# Patient Record
Sex: MALE | Race: WHITE | NOT HISPANIC OR LATINO | Employment: FULL TIME | URBAN - METROPOLITAN AREA
[De-identification: names, ages, dates, MRNs, and addresses within clinical notes are randomized per-mention and may not be internally consistent; named-entity substitution may affect disease eponyms.]

---

## 2023-09-01 ENCOUNTER — TELEPHONE (OUTPATIENT)
Dept: GASTROENTEROLOGY | Facility: CLINIC | Age: 48
End: 2023-09-01
Payer: COMMERCIAL

## 2023-09-01 NOTE — TELEPHONE ENCOUNTER
Pt. Has never been seen here before. He may have an OV with MD or NP. Agree if he's having severe pain and unable to have BM needs to go to ER.   KN

## 2023-09-01 NOTE — TELEPHONE ENCOUNTER
Returned callers call and states he been having stomach pain since last Thursday, no change in food or anuthing, has lost 10 lb from all this and he states its liquid but little stool when coming out and also a little blood. Did go to urgent care on [address] and they did a Xray ans states they seen he was backed up and he also states he will get the records and he also is going to [facility] to be seen.

## 2023-09-01 NOTE — TELEPHONE ENCOUNTER
----- Message from Shoshana Boo sent at 9/1/2023 11:33 AM CDT -----  Contact: Patient  Type:  Same Day Appointment Request    Caller is requesting a same day appointment.  Caller declined first available appointment listed below.    Name of Caller:Freya Villalpando   When is the first available appointment?11/22  Symptoms:unable to produce bowel movement   Best Call Back Number:[phone]  Additional Information:

## 2023-09-06 ENCOUNTER — TELEPHONE (OUTPATIENT)
Dept: GASTROENTEROLOGY | Facility: CLINIC | Age: 48
End: 2023-09-06
Payer: COMMERCIAL

## 2023-09-06 NOTE — TELEPHONE ENCOUNTER
----- Message from Dolores Cerrato sent at 9/6/2023  2:53 PM CDT -----  Contact: pt  Pt returning a call about np appt and can be reached at [phone]     Thanks,

## 2023-09-06 NOTE — TELEPHONE ENCOUNTER
Reached out to pt and l/m for him if he wanted to make a OV w/ MD or NP since he has not been since before by the office.

## 2023-09-07 ENCOUNTER — TELEPHONE (OUTPATIENT)
Dept: GASTROENTEROLOGY | Facility: CLINIC | Age: 48
End: 2023-09-07
Payer: COMMERCIAL

## 2023-09-07 NOTE — TELEPHONE ENCOUNTER
Per Sergeyi: attempted to get pt scheduled. He declined appt at this time. States he is feeling better. -KG

## 2023-09-19 ENCOUNTER — OFFICE VISIT (OUTPATIENT)
Dept: FAMILY MEDICINE | Facility: CLINIC | Age: 48
End: 2023-09-19
Payer: COMMERCIAL

## 2023-09-19 VITALS
WEIGHT: 173.81 LBS | TEMPERATURE: 97 F | OXYGEN SATURATION: 97 % | HEART RATE: 62 BPM | RESPIRATION RATE: 15 BRPM | BODY MASS INDEX: 25.74 KG/M2 | HEIGHT: 69 IN | SYSTOLIC BLOOD PRESSURE: 118 MMHG | DIASTOLIC BLOOD PRESSURE: 80 MMHG

## 2023-09-19 DIAGNOSIS — E78.5 HYPERLIPIDEMIA, UNSPECIFIED HYPERLIPIDEMIA TYPE: ICD-10-CM

## 2023-09-19 DIAGNOSIS — Z13.31 POSITIVE DEPRESSION SCREENING: ICD-10-CM

## 2023-09-19 DIAGNOSIS — Z76.89 ENCOUNTER TO ESTABLISH CARE: Primary | ICD-10-CM

## 2023-09-19 DIAGNOSIS — F17.210 CIGARETTE NICOTINE DEPENDENCE WITHOUT COMPLICATION: ICD-10-CM

## 2023-09-19 PROCEDURE — 99386 PR PREVENTIVE VISIT,NEW,40-64: ICD-10-PCS | Mod: S$GLB,,, | Performed by: INTERNAL MEDICINE

## 2023-09-19 PROCEDURE — 1159F MED LIST DOCD IN RCRD: CPT | Mod: CPTII,S$GLB,, | Performed by: INTERNAL MEDICINE

## 2023-09-19 PROCEDURE — 3008F BODY MASS INDEX DOCD: CPT | Mod: CPTII,S$GLB,, | Performed by: INTERNAL MEDICINE

## 2023-09-19 PROCEDURE — 99386 PREV VISIT NEW AGE 40-64: CPT | Mod: S$GLB,,, | Performed by: INTERNAL MEDICINE

## 2023-09-19 PROCEDURE — 3008F PR BODY MASS INDEX (BMI) DOCUMENTED: ICD-10-PCS | Mod: CPTII,S$GLB,, | Performed by: INTERNAL MEDICINE

## 2023-09-19 PROCEDURE — 3079F PR MOST RECENT DIASTOLIC BLOOD PRESSURE 80-89 MM HG: ICD-10-PCS | Mod: CPTII,S$GLB,, | Performed by: INTERNAL MEDICINE

## 2023-09-19 PROCEDURE — 3074F PR MOST RECENT SYSTOLIC BLOOD PRESSURE < 130 MM HG: ICD-10-PCS | Mod: CPTII,S$GLB,, | Performed by: INTERNAL MEDICINE

## 2023-09-19 PROCEDURE — 1160F PR REVIEW ALL MEDS BY PRESCRIBER/CLIN PHARMACIST DOCUMENTED: ICD-10-PCS | Mod: CPTII,S$GLB,, | Performed by: INTERNAL MEDICINE

## 2023-09-19 PROCEDURE — 1160F RVW MEDS BY RX/DR IN RCRD: CPT | Mod: CPTII,S$GLB,, | Performed by: INTERNAL MEDICINE

## 2023-09-19 PROCEDURE — 1159F PR MEDICATION LIST DOCUMENTED IN MEDICAL RECORD: ICD-10-PCS | Mod: CPTII,S$GLB,, | Performed by: INTERNAL MEDICINE

## 2023-09-19 PROCEDURE — 3074F SYST BP LT 130 MM HG: CPT | Mod: CPTII,S$GLB,, | Performed by: INTERNAL MEDICINE

## 2023-09-19 PROCEDURE — 3079F DIAST BP 80-89 MM HG: CPT | Mod: CPTII,S$GLB,, | Performed by: INTERNAL MEDICINE

## 2023-09-19 RX ORDER — IBUPROFEN 800 MG/1
800 TABLET ORAL DAILY PRN
COMMUNITY

## 2023-09-19 RX ORDER — PRAVASTATIN SODIUM 40 MG/1
TABLET ORAL
COMMUNITY
Start: 2023-05-27

## 2023-09-19 NOTE — PROGRESS NOTES
Subjective:       Patient ID: Autumn Villalpando is a 48 y.o. male.    Chief Complaint: Establish Care (New pt. To est care.  Pt. Has been having GI issues/colitis, but had appt with Dr. Coughlin for this.  No other c/o today.)    HPI    48 y.o. male here to establish care.     Has apt w/ Dr. Coughlin on 9/26.  [facility] and [facility] ca screen CT.     Was on wellbutrin for depression and smoking cessation but felt like no benefit so it stopped.   [facility].     The patient has no Health Maintenance topics of status Not Due    Health Maintenance Due   Topic Date Due    Hepatitis C Screening  Never done    Lipid Panel  Never done    Pneumococcal Vaccines (Age 0-64) (1 - PCV) Never done    HIV Screening  Never done    TETANUS VACCINE  Never done    Hemoglobin A1c (Diabetic Prevention Screening)  Never done    COVID-19 Vaccine (2 - Moderna series) 10/22/2021    Colorectal Cancer Screening  Never done    Influenza Vaccine (1) Never done         Medical Problems:      Patient Active Problem List   Diagnosis    Hyperlipidemia    Cigarette nicotine dependence without complication    Positive depression screening       Current Outpatient Medications on File Prior to Visit   Medication Sig Dispense Refill    ibuprofen (ADVIL,MOTRIN) 800 MG tablet Take 800 mg by mouth daily as needed for Pain.      pravastatin (PRAVACHOL) 40 MG tablet        No current facility-administered medications on file prior to visit.           Past Medical History:   Diagnosis Date    Chronic headaches     Depression     Mixed hyperlipidemia     PTSD (post-traumatic stress disorder)     TBI (traumatic brain injury)      Past Surgical History:   Procedure Laterality Date    WISDOM TOOTH EXTRACTION       History reviewed. No pertinent family history.  Social History     Socioeconomic History    Marital status:    Tobacco Use    Smoking status: Every Day     Current packs/day: 1.00     Types: Cigarettes     " Passive exposure: Past    Smokeless tobacco: Never   Substance and Sexual Activity    Alcohol use: Yes    Drug use: Never     Review of patient's allergies indicates:  No Known Allergies    Current Outpatient Medications:     ibuprofen (ADVIL,MOTRIN) 800 MG tablet, Take 800 mg by mouth daily as needed for Pain., Disp: , Rfl:     pravastatin (PRAVACHOL) 40 MG tablet, , Disp: , Rfl:         Review of Systems   Constitutional:  Negative for diaphoresis and fever.   HENT:  Negative for trouble swallowing.    Respiratory:  Negative for cough and shortness of breath.    Cardiovascular:  Negative for chest pain and leg swelling.   Gastrointestinal:  Positive for abdominal pain and diarrhea.   Genitourinary:  Negative for difficulty urinating.   Musculoskeletal:  Negative for gait problem.   Skin:  Negative for pallor.   Neurological:  Negative for syncope.   Psychiatric/Behavioral:  Positive for dysphoric mood.        Objective:        Vitals:    09/19/23 1311   BP: 118/80   BP Location: Left arm   Patient Position: Sitting   BP Method: Large (Manual)   Pulse: 62   Resp: 15   Temp: 97.4 °F (36.3 °C)   TempSrc: Temporal   SpO2: 97%   Weight: 78.8 kg (173 lb 12.8 oz)   Height: 5' 9" (1.753 m)       Body mass index is 25.67 kg/m².    Physical Exam  Constitutional:       General: He is not in acute distress.     Appearance: He is well-developed. He is not diaphoretic.   HENT:      Head: Normocephalic and atraumatic.      Right Ear: External ear normal.      Left Ear: External ear normal.   Eyes:      Conjunctiva/sclera: Conjunctivae normal.   Cardiovascular:      Rate and Rhythm: Normal rate and regular rhythm.   Pulmonary:      Effort: Pulmonary effort is normal.      Breath sounds: Normal breath sounds.   Abdominal:      General: Bowel sounds are normal. There is no distension.      Palpations: Abdomen is soft.   Musculoskeletal:      Cervical back: Neck supple.      Right lower leg: No edema.      Left lower leg: No edema. "   Skin:     General: Skin is warm and dry.      Nails: There is no clubbing.   Neurological:      General: No focal deficit present.      Mental Status: He is alert.   Psychiatric:         Mood and Affect: Mood normal.         Speech: Speech normal.         Behavior: Behavior normal.         Judgment: Judgment normal.         Assessment:     1. Encounter to establish care    2. Hyperlipidemia, unspecified hyperlipidemia type    3. Cigarette nicotine dependence without complication    4. Positive depression screening           Plan:         1. Encounter to establish care  - reviewed healthcare maintenance and pt's chronic medical problems.  - will [facility], lung CA screen CT results, etc.   - will obtain future records from Dr. Coughlin () chintan c-scope report   - labs - obtain records  - immunizations reviewed  - CRC screen - tbd- obtain records    2. Hyperlipidemia, unspecified hyperlipidemia type    - pravastatin (PRAVACHOL) 40 MG tablet    3. Cigarette nicotine dependence without complication  - encouraged complete cessation.     4. Positive depression screening  - I have reviewed the positive depression score with the patient and found that no additional intervention is needed at this time. Pt is seeing psychiatry and [facility]. He has local group therapy.            Unless there are intervening problems, Follow up in about 1 year (around 9/19/2024), or if symptoms worsen or fail to improve, for annual.      Patient note was created using MModal Dictation.  Any errors in syntax or even information may not have been identified and edited on initial review prior to signing this note.

## 2023-09-22 ENCOUNTER — PATIENT OUTREACH (OUTPATIENT)
Dept: ADMINISTRATIVE | Facility: HOSPITAL | Age: 48
End: 2023-09-22
Payer: COMMERCIAL

## 2023-09-22 ENCOUNTER — TELEPHONE (OUTPATIENT)
Dept: FAMILY MEDICINE | Facility: CLINIC | Age: 48
End: 2023-09-22
Payer: COMMERCIAL

## 2023-09-22 NOTE — LETTER
AUTHORIZATION FOR RELEASE OF   CONFIDENTIAL INFORMATION        We are seeing Autumn Villalpando, date of birth 1975, in the clinic at [facility]. Kika Rojas MD is the patient's PCP. Autumn Villalpando has an outstanding lab/procedure at the time we reviewed his chart. In order to help keep his health information updated, he has authorized us to request the following medical record(s):        (  )  MAMMOGRAM                                      (  )  COLONOSCOPY      (  )  PAP SMEAR                                          (X)  OUTSIDE LAB RESULTS     (  )  DEXA SCAN                                          (  )  EYE EXAM            (  )  FOOT EXAM                                          (  )  ENTIRE RECORD     (X)  OUTSIDE IMMUNIZATIONS                 (X)  Low Dose CT Scan         Please fax records to [facility], Paulk, Emily M, MD, [phone]    [facility]  Care Coordination Department  [facility]  [phone]    Patient Name: Autumn Villalpando  : 1975  Patient Phone #: [phone]

## 2023-09-22 NOTE — PROGRESS NOTES
Health Maintenance JOSE 09/19/2023:    JOSE faxed to [facility] for Low Dose CT Scan  F: [phone]  P: [phone]    JOSE faxed to [facility] for Labs  F: [phone]  P: [phone]

## 2023-09-22 NOTE — TELEPHONE ENCOUNTER
Attempted to contact pt. To obtain [facility] Fax # or Phone # so we can fax JOSE form to them to receive med. Records.    LMVMx2 for pt. To call back.

## 2023-09-29 ENCOUNTER — TELEPHONE (OUTPATIENT)
Dept: FAMILY MEDICINE | Facility: CLINIC | Age: 48
End: 2023-09-29
Payer: COMMERCIAL

## 2023-09-29 NOTE — PROGRESS NOTES
2nd Req-JSOE faxed to [facility] for Low Dose CT Scan  F: [phone]  P: [phone]    2nd Req-JOSE faxed to [facility] for Labs  F: [phone]  P: [phone]

## 2023-09-29 NOTE — TELEPHONE ENCOUNTER
Attempted to contact pt. Again to [facility] [place] so we can [facility] JOSE Form.    LMVMx2 for pt. To call back.

## 2023-10-02 ENCOUNTER — TELEPHONE (OUTPATIENT)
Dept: FAMILY MEDICINE | Facility: CLINIC | Age: 48
End: 2023-10-02
Payer: COMMERCIAL

## 2023-10-06 NOTE — PROGRESS NOTES
3rd Req-Contacted [facility]-Low Dose CT Scan/[facility]-L[facility]  to follow up on requested records. Forwarded to EDWARDO SIBLEY to return call to clinic. Request faxed again

## 2023-10-13 NOTE — PROGRESS NOTES
Tried to contact patient to discuss requested records. No answer. No record received after several attempts.